# Patient Record
Sex: FEMALE | Race: BLACK OR AFRICAN AMERICAN | NOT HISPANIC OR LATINO | URBAN - METROPOLITAN AREA
[De-identification: names, ages, dates, MRNs, and addresses within clinical notes are randomized per-mention and may not be internally consistent; named-entity substitution may affect disease eponyms.]

---

## 2022-01-26 ENCOUNTER — OFFICE VISIT (OUTPATIENT)
Dept: URBAN - METROPOLITAN AREA CLINIC 27 | Facility: CLINIC | Age: 82
End: 2022-01-26

## 2022-01-26 PROBLEM — 59621000 ESSENTIAL HYPERTENSION: Status: ACTIVE | Noted: 2022-01-26

## 2022-01-26 PROBLEM — 21101000119105 PHARYNGEAL DYSPHAGIA: Status: ACTIVE | Noted: 2022-01-26

## 2022-01-26 PROBLEM — 301754002 RIGHT LOWER QUADRANT PAIN: Status: ACTIVE | Noted: 2022-01-26

## 2022-03-04 ENCOUNTER — OFFICE VISIT (OUTPATIENT)
Dept: URBAN - METROPOLITAN AREA SURGERY CENTER 7 | Facility: SURGERY CENTER | Age: 82
End: 2022-03-04

## 2022-04-15 ENCOUNTER — OFFICE VISIT (OUTPATIENT)
Dept: URBAN - METROPOLITAN AREA CLINIC 27 | Facility: CLINIC | Age: 82
End: 2022-04-15

## 2022-04-15 PROBLEM — 62315008 DIARRHEA: Status: ACTIVE | Noted: 2022-04-15

## 2022-04-30 ENCOUNTER — TELEPHONE ENCOUNTER (OUTPATIENT)
Dept: URBAN - METROPOLITAN AREA CLINIC 121 | Facility: CLINIC | Age: 82
End: 2022-04-30

## 2022-04-30 RX ORDER — PANTOPRAZOLE SODIUM 40 MG/1
TAKE 1 TABLET BY MOUTH ONCE A DAY TABLET, DELAYED RELEASE ORAL
OUTPATIENT
Start: 2022-01-26 | End: 2022-05-26

## 2022-04-30 RX ORDER — CHOLESTYRAMINE 4 G/9G
TAKE 1 PACKET BY MOUTH THREE TIMES A DAY AS DIRECTED IN CUP OF APPLESAUCE POWDER, FOR SUSPENSION ORAL
OUTPATIENT
Start: 2022-01-26 | End: 2022-02-25

## 2022-05-01 ENCOUNTER — TELEPHONE ENCOUNTER (OUTPATIENT)
Dept: URBAN - METROPOLITAN AREA CLINIC 121 | Facility: CLINIC | Age: 82
End: 2022-05-01

## 2022-05-01 RX ORDER — AMLODIPINE BESYLATE 5 MG/1
TABLET ORAL
Status: ACTIVE | COMMUNITY

## 2022-05-01 RX ORDER — PANTOPRAZOLE SODIUM 40 MG/1
TAKE 1 TABLET BY MOUTH ONCE A DAY TABLET, DELAYED RELEASE ORAL
Status: ACTIVE | COMMUNITY
Start: 2022-01-26 | End: 2022-08-13

## 2022-05-01 RX ORDER — LABETALOL HYDROCHLORIDE 200 MG/1
TABLET, FILM COATED ORAL
Status: ACTIVE | COMMUNITY

## 2022-05-01 RX ORDER — LOSARTAN POTASSIUM 50 MG/1
TABLET, FILM COATED ORAL
Status: ACTIVE | COMMUNITY

## 2022-05-01 RX ORDER — CHOLESTYRAMINE 4 G/9G
TAKE 1 PACKET BY MOUTH THREE TIMES A DAY AS DIRECTED IN CUP OF APPLESAUCE POWDER, FOR SUSPENSION ORAL
Status: ACTIVE | COMMUNITY
Start: 2022-01-26 | End: 2022-05-19

## 2022-05-01 RX ORDER — SIMVASTATIN 20 MG/1
TABLET, FILM COATED ORAL
Status: ACTIVE | COMMUNITY

## 2022-05-01 RX ORDER — HYDROCHLOROTHIAZIDE 12.5 MG/1
TABLET ORAL
Status: ACTIVE | COMMUNITY

## 2022-05-01 RX ORDER — CALC/FA/B/D3/E/BIOFLAV/SOYBEAN 600 MG-1MG
CAPSULE ORAL
Status: ACTIVE | COMMUNITY

## 2022-05-13 ENCOUNTER — ERX REFILL RESPONSE (OUTPATIENT)
Dept: URBAN - METROPOLITAN AREA CLINIC 27 | Facility: CLINIC | Age: 82
End: 2022-05-13

## 2022-05-13 RX ORDER — CHOLESTYRAMINE 4 G/9G
TAKE 1 PACKET BY MOUTH THREE TIMES DAILY AS DIRECTED IN CUP OF APPLESAUCE POWDER, FOR SUSPENSION ORAL
Qty: 90 EACH | Refills: 1 | OUTPATIENT

## 2022-05-13 RX ORDER — CHOLESTYRAMINE 4 G/9G
TAKE 1 PACKET BY MOUTH THREE TIMES A DAY AS DIRECTED IN CUP OF APPLESAUCE POWDER, FOR SUSPENSION ORAL
OUTPATIENT

## 2022-05-16 ENCOUNTER — ERX REFILL RESPONSE (OUTPATIENT)
Dept: URBAN - METROPOLITAN AREA CLINIC 27 | Facility: CLINIC | Age: 82
End: 2022-05-16

## 2022-05-16 RX ORDER — CHOLESTYRAMINE 4 G/9G
TAKE 1 PACKET BY MOUTH THREE TIMES DAILY AS DIRECTED IN CUP OF APPLESAUCE POWDER, FOR SUSPENSION ORAL
Qty: 90 EACH | Refills: 1 | OUTPATIENT

## 2022-05-16 RX ORDER — CHOLESTYRAMINE 4 G/9G
MIX 1 PACKET IN APPLESAUCE AND TAKE BY MOUTH THREE TIMES DAILY POWDER, FOR SUSPENSION ORAL
Qty: 270 EACH | Refills: 1 | OUTPATIENT

## 2022-05-19 ENCOUNTER — ERX REFILL RESPONSE (OUTPATIENT)
Dept: URBAN - METROPOLITAN AREA CLINIC 27 | Facility: CLINIC | Age: 82
End: 2022-05-19

## 2022-05-19 RX ORDER — CHOLESTYRAMINE 4 G/9G
MIX 1 PACKET IN APPLESAUCE AND TAKE BY MOUTH THREE TIMES DAILY POWDER, FOR SUSPENSION ORAL
Qty: 270 EACH | Refills: 1 | OUTPATIENT

## 2022-05-19 RX ORDER — CHOLESTYRAMINE 4 G/9G
MIX 1 PACKET IN APPLESAUCE AND TAKE BY MOUTH THREE TIMES DAILY POWDER, FOR SUSPENSION ORAL
Qty: 362 EACH | Refills: 1 | OUTPATIENT

## 2023-02-10 ENCOUNTER — WEB ENCOUNTER (OUTPATIENT)
Dept: URBAN - METROPOLITAN AREA CLINIC 27 | Facility: CLINIC | Age: 83
End: 2023-02-10

## 2023-02-10 ENCOUNTER — DASHBOARD ENCOUNTERS (OUTPATIENT)
Age: 83
End: 2023-02-10

## 2023-02-10 ENCOUNTER — OFFICE VISIT (OUTPATIENT)
Dept: URBAN - METROPOLITAN AREA CLINIC 27 | Facility: CLINIC | Age: 83
End: 2023-02-10
Payer: MEDICARE

## 2023-02-10 VITALS
BODY MASS INDEX: 25.87 KG/M2 | HEIGHT: 63 IN | SYSTOLIC BLOOD PRESSURE: 122 MMHG | DIASTOLIC BLOOD PRESSURE: 70 MMHG | WEIGHT: 146 LBS | HEART RATE: 72 BPM

## 2023-02-10 DIAGNOSIS — K21.9 GASTRO-ESOPHAGEAL REFLUX DISEASE WITHOUT ESOPHAGITIS: ICD-10-CM

## 2023-02-10 DIAGNOSIS — Z80.0 FAMILY HISTORY OF COLON CANCER: ICD-10-CM

## 2023-02-10 PROBLEM — 266435005 GASTRO-ESOPHAGEAL REFLUX DISEASE WITHOUT ESOPHAGITIS: Status: ACTIVE | Noted: 2022-01-26

## 2023-02-10 PROCEDURE — 99212 OFFICE O/P EST SF 10 MIN: CPT | Performed by: INTERNAL MEDICINE

## 2023-02-10 RX ORDER — SIMVASTATIN 20 MG/1
TABLET, FILM COATED ORAL
Status: ACTIVE | COMMUNITY

## 2023-02-10 RX ORDER — LABETALOL HYDROCHLORIDE 200 MG/1
TABLET, FILM COATED ORAL
Status: ACTIVE | COMMUNITY

## 2023-02-10 RX ORDER — CALC/FA/B/D3/E/BIOFLAV/SOYBEAN 600 MG-1MG
CAPSULE ORAL
Status: ACTIVE | COMMUNITY

## 2023-02-10 RX ORDER — AMLODIPINE BESYLATE 5 MG/1
TABLET ORAL
Status: ACTIVE | COMMUNITY

## 2023-02-10 RX ORDER — HYDROCHLOROTHIAZIDE 12.5 MG/1
TABLET ORAL
Status: ACTIVE | COMMUNITY

## 2023-02-10 RX ORDER — LOSARTAN POTASSIUM 50 MG/1
TABLET, FILM COATED ORAL
Status: ACTIVE | COMMUNITY

## 2023-02-10 RX ORDER — CHOLESTYRAMINE 4 G/9G
MIX 1 PACKET IN APPLESAUCE AND TAKE BY MOUTH THREE TIMES DAILY POWDER, FOR SUSPENSION ORAL
Qty: 362 EACH | Refills: 1 | Status: ACTIVE | COMMUNITY

## 2023-02-10 NOTE — HPI-SCREENING
Mrs. Raya presents today because she said she received a letter that she was due for procedure.  However, she had a normal colonoscopy in 2021.  I performed an upper endoscopy last year for dysphagia and she is no longer having issues swallowing.  She is taking Pantoprazole daily.  Otherwise, she has no other GI complaints.

## 2023-02-20 ENCOUNTER — ERX REFILL RESPONSE (OUTPATIENT)
Dept: URBAN - METROPOLITAN AREA CLINIC 27 | Facility: CLINIC | Age: 83
End: 2023-02-20

## 2023-02-20 RX ORDER — PANTOPRAZOLE SODIUM 40 MG/1
TAKE 1 TABLET BY MOUTH EVERY DAY TABLET, DELAYED RELEASE ORAL
Qty: 90 TABLET | Refills: 3 | OUTPATIENT

## 2023-02-20 RX ORDER — PANTOPRAZOLE SODIUM 40 MG/1
TAKE 1 TABLET BY MOUTH EVERY DAY TABLET, DELAYED RELEASE ORAL
Qty: 90 TABLET | Refills: 0 | OUTPATIENT

## 2024-03-18 ENCOUNTER — OV EP (OUTPATIENT)
Dept: URBAN - METROPOLITAN AREA CLINIC 27 | Facility: CLINIC | Age: 84
End: 2024-03-18
Payer: MEDICARE

## 2024-03-18 DIAGNOSIS — K21.9 GASTRO-ESOPHAGEAL REFLUX DISEASE WITHOUT ESOPHAGITIS: ICD-10-CM

## 2024-03-18 PROCEDURE — 99214 OFFICE O/P EST MOD 30 MIN: CPT | Performed by: INTERNAL MEDICINE

## 2024-03-18 RX ORDER — PANTOPRAZOLE SODIUM 40 MG/1
1 TABLET TABLET, DELAYED RELEASE ORAL ONCE A DAY
Qty: 90 TABLET | Refills: 5 | OUTPATIENT
Start: 2024-03-18

## 2024-03-18 RX ORDER — HYDROCHLOROTHIAZIDE 12.5 MG/1
TABLET ORAL
Status: ACTIVE | COMMUNITY

## 2024-03-18 RX ORDER — LOSARTAN POTASSIUM 50 MG/1
TABLET, FILM COATED ORAL
Status: ACTIVE | COMMUNITY

## 2024-03-18 RX ORDER — CALC/FA/B/D3/E/BIOFLAV/SOYBEAN 600 MG-1MG
CAPSULE ORAL
Status: ACTIVE | COMMUNITY

## 2024-03-18 RX ORDER — LABETALOL HYDROCHLORIDE 200 MG/1
TABLET, FILM COATED ORAL
Status: ACTIVE | COMMUNITY

## 2024-03-18 RX ORDER — PANTOPRAZOLE SODIUM 40 MG/1
TAKE 1 TABLET BY MOUTH EVERY DAY TABLET, DELAYED RELEASE ORAL
Qty: 90 TABLET | Refills: 3 | Status: ACTIVE | COMMUNITY

## 2024-03-18 RX ORDER — CHOLESTYRAMINE 4 G/9G
MIX 1 PACKET IN APPLESAUCE AND TAKE BY MOUTH THREE TIMES DAILY POWDER, FOR SUSPENSION ORAL
Qty: 362 EACH | Refills: 1 | Status: ACTIVE | COMMUNITY

## 2024-03-18 RX ORDER — AMLODIPINE BESYLATE 5 MG/1
TABLET ORAL
Status: ACTIVE | COMMUNITY

## 2024-03-18 RX ORDER — SIMVASTATIN 20 MG/1
TABLET, FILM COATED ORAL
Status: ACTIVE | COMMUNITY

## 2024-03-18 NOTE — HPI-TODAY'S VISIT:
Mrs. Raya presents today requesting a refill of her Pantoprazole for her GERD.  She tried to stop taking the medicine and noticed an exacerbation of her heartburn symptoms.  Otherwise, she has no other GI complaints.

## 2024-07-08 ENCOUNTER — TELEPHONE ENCOUNTER (OUTPATIENT)
Dept: URBAN - METROPOLITAN AREA CLINIC 27 | Facility: CLINIC | Age: 84
End: 2024-07-08

## 2024-07-08 RX ORDER — CHOLESTYRAMINE 4 G/9G
1 PACKET MIXED WITH WATER OR NON-CARBONATED DRINK OR APPLESAUCE POWDER, FOR SUSPENSION ORAL ONCE A DAY
Qty: 90 PACKET | Refills: 3

## 2024-09-13 ENCOUNTER — ERX REFILL RESPONSE (OUTPATIENT)
Dept: URBAN - METROPOLITAN AREA CLINIC 27 | Facility: CLINIC | Age: 84
End: 2024-09-13

## 2024-09-13 ENCOUNTER — TELEPHONE ENCOUNTER (OUTPATIENT)
Dept: URBAN - METROPOLITAN AREA CLINIC 27 | Facility: CLINIC | Age: 84
End: 2024-09-13

## 2024-09-13 RX ORDER — CHOLESTYRAMINE 4 G/9G
1 PACKET MIXED WITH WATER OR NON-CARBONATED DRINK OR APPLESAUCE POWDER, FOR SUSPENSION ORAL ONCE A DAY
Qty: 30 | Refills: 0 | OUTPATIENT

## 2024-09-13 RX ORDER — CHOLESTYRAMINE 4 G/9G
1 PACKET MIXED WITH WATER OR NON-CARBONATED DRINK OR APPLESAUCE POWDER, FOR SUSPENSION ORAL ONCE A DAY
Qty: 30 | Refills: 0

## 2024-09-13 RX ORDER — CHOLESTYRAMINE 4 G/9G
USE 1 PACKET MIXED WITH WATER OR NON-CARBONATED DRINK OR APPLE SAUCE EVERY DAY POWDER, FOR SUSPENSION ORAL
Qty: 90 EACH | Refills: 0 | OUTPATIENT

## 2024-09-16 ENCOUNTER — OFFICE VISIT (OUTPATIENT)
Dept: URBAN - METROPOLITAN AREA CLINIC 27 | Facility: CLINIC | Age: 84
End: 2024-09-16

## 2024-09-30 ENCOUNTER — OFFICE VISIT (OUTPATIENT)
Dept: URBAN - METROPOLITAN AREA CLINIC 27 | Facility: CLINIC | Age: 84
End: 2024-09-30

## 2024-10-07 ENCOUNTER — OFFICE VISIT (OUTPATIENT)
Dept: URBAN - METROPOLITAN AREA CLINIC 27 | Facility: CLINIC | Age: 84
End: 2024-10-07
Payer: COMMERCIAL

## 2024-10-07 VITALS
DIASTOLIC BLOOD PRESSURE: 71 MMHG | HEIGHT: 63 IN | BODY MASS INDEX: 26.05 KG/M2 | WEIGHT: 147 LBS | SYSTOLIC BLOOD PRESSURE: 128 MMHG | HEART RATE: 75 BPM

## 2024-10-07 DIAGNOSIS — K21.9 GASTRO-ESOPHAGEAL REFLUX DISEASE WITHOUT ESOPHAGITIS: ICD-10-CM

## 2024-10-07 PROCEDURE — 99212 OFFICE O/P EST SF 10 MIN: CPT | Performed by: INTERNAL MEDICINE

## 2024-10-07 RX ORDER — SIMVASTATIN 20 MG/1
TABLET, FILM COATED ORAL
Status: ACTIVE | COMMUNITY

## 2024-10-07 RX ORDER — PANTOPRAZOLE SODIUM 40 MG/1
1 TABLET TABLET, DELAYED RELEASE ORAL ONCE A DAY
Qty: 90 TABLET | Refills: 5 | Status: ACTIVE | COMMUNITY
Start: 2024-03-18

## 2024-10-07 RX ORDER — AMLODIPINE BESYLATE 5 MG/1
TABLET ORAL
Status: ACTIVE | COMMUNITY

## 2024-10-07 RX ORDER — HYDROCHLOROTHIAZIDE 12.5 MG/1
TABLET ORAL
Status: ACTIVE | COMMUNITY

## 2024-10-07 RX ORDER — CHOLESTYRAMINE 4 G/9G
USE 1 PACKET MIXED WITH WATER OR NON-CARBONATED DRINK OR APPLE SAUCE EVERY DAY POWDER, FOR SUSPENSION ORAL
Qty: 90 EACH | Refills: 0 | Status: ACTIVE | COMMUNITY

## 2024-10-07 RX ORDER — LABETALOL HYDROCHLORIDE 200 MG/1
TABLET, FILM COATED ORAL
Status: ACTIVE | COMMUNITY

## 2024-10-07 RX ORDER — LOSARTAN POTASSIUM 50 MG/1
TABLET, FILM COATED ORAL
Status: ACTIVE | COMMUNITY

## 2024-10-07 RX ORDER — CALC/FA/B/D3/E/BIOFLAV/SOYBEAN 600 MG-1MG
CAPSULE ORAL
Status: ACTIVE | COMMUNITY

## 2024-10-07 RX ORDER — PANTOPRAZOLE SODIUM 40 MG/1
TAKE 1 TABLET BY MOUTH EVERY DAY TABLET, DELAYED RELEASE ORAL
Qty: 90 TABLET | Refills: 3 | Status: ACTIVE | COMMUNITY

## 2024-10-07 NOTE — HPI-TODAY'S VISIT:
Mrs. Raya presents today for routine follow-up of her heartburn.  She reports that she is doing extremely well taking pantoprazole once daily.  She is currently doing well and she has no complaints.  She does not need any refills today.

## 2025-07-29 ENCOUNTER — OFFICE VISIT (OUTPATIENT)
Dept: URBAN - METROPOLITAN AREA TELEHEALTH 2 | Facility: TELEHEALTH | Age: 85
End: 2025-07-29
Payer: COMMERCIAL

## 2025-07-29 DIAGNOSIS — K21.9 GASTRO-ESOPHAGEAL REFLUX DISEASE WITHOUT ESOPHAGITIS: ICD-10-CM

## 2025-07-29 PROCEDURE — 99443 PHONE E/M BY PHYS 21-30 MIN: CPT | Performed by: INTERNAL MEDICINE

## 2025-07-29 PROCEDURE — 99213 OFFICE O/P EST LOW 20 MIN: CPT | Performed by: INTERNAL MEDICINE

## 2025-07-29 RX ORDER — PANTOPRAZOLE SODIUM 40 MG/1
1 TABLET 1/2 TO 1 HOUR BEFORE MORNING MEAL TABLET, DELAYED RELEASE ORAL ONCE A DAY
Qty: 90 TABLET | Refills: 5 | OUTPATIENT
Start: 2025-07-29

## 2025-07-29 RX ORDER — PANTOPRAZOLE SODIUM 40 MG/1
1 TABLET TABLET, DELAYED RELEASE ORAL ONCE A DAY
Qty: 90 TABLET | Refills: 5 | Status: ACTIVE | COMMUNITY
Start: 2024-03-18

## 2025-07-29 RX ORDER — PANTOPRAZOLE SODIUM 40 MG/1
TAKE 1 TABLET BY MOUTH EVERY DAY TABLET, DELAYED RELEASE ORAL
Qty: 90 TABLET | Refills: 3 | Status: ACTIVE | COMMUNITY

## 2025-07-29 RX ORDER — HYDROCHLOROTHIAZIDE 12.5 MG/1
TABLET ORAL
Status: ACTIVE | COMMUNITY

## 2025-07-29 RX ORDER — SIMVASTATIN 20 MG/1
TABLET, FILM COATED ORAL
Status: ACTIVE | COMMUNITY

## 2025-07-29 RX ORDER — AMLODIPINE BESYLATE 5 MG/1
TABLET ORAL
Status: ACTIVE | COMMUNITY

## 2025-07-29 RX ORDER — LOSARTAN POTASSIUM 50 MG/1
TABLET, FILM COATED ORAL
Status: ACTIVE | COMMUNITY

## 2025-07-29 RX ORDER — CHOLESTYRAMINE 4 G/9G
USE 1 PACKET MIXED WITH WATER OR NON-CARBONATED DRINK OR APPLE SAUCE EVERY DAY POWDER, FOR SUSPENSION ORAL
Qty: 90 EACH | Refills: 0 | Status: ACTIVE | COMMUNITY

## 2025-07-29 RX ORDER — CALC/FA/B/D3/E/BIOFLAV/SOYBEAN 600 MG-1MG
CAPSULE ORAL
Status: ACTIVE | COMMUNITY

## 2025-07-29 RX ORDER — LABETALOL HYDROCHLORIDE 200 MG/1
TABLET, FILM COATED ORAL
Status: ACTIVE | COMMUNITY

## 2025-07-29 NOTE — HPI-TODAY'S VISIT:
Mrs. Raya calls in today via telehealth visit for refill of her pantoprazole.  She has been out of the medication for 3 days and has been having significant heartburn.  She reports this is the best medicine that helps controls her symptoms.  Otherwise, she has no other GI complaints.